# Patient Record
Sex: FEMALE | Race: WHITE | NOT HISPANIC OR LATINO | Employment: OTHER | URBAN - METROPOLITAN AREA
[De-identification: names, ages, dates, MRNs, and addresses within clinical notes are randomized per-mention and may not be internally consistent; named-entity substitution may affect disease eponyms.]

---

## 2017-04-03 ENCOUNTER — ALLSCRIPTS OFFICE VISIT (OUTPATIENT)
Dept: OTHER | Facility: OTHER | Age: 52
End: 2017-04-03

## 2018-01-13 VITALS
BODY MASS INDEX: 20.99 KG/M2 | OXYGEN SATURATION: 99 % | HEART RATE: 66 BPM | WEIGHT: 130.6 LBS | DIASTOLIC BLOOD PRESSURE: 78 MMHG | TEMPERATURE: 98.2 F | HEIGHT: 66 IN | SYSTOLIC BLOOD PRESSURE: 128 MMHG | RESPIRATION RATE: 18 BRPM

## 2018-05-18 ENCOUNTER — OFFICE VISIT (OUTPATIENT)
Dept: URGENT CARE | Facility: CLINIC | Age: 53
End: 2018-05-18
Payer: COMMERCIAL

## 2018-05-18 VITALS
DIASTOLIC BLOOD PRESSURE: 70 MMHG | TEMPERATURE: 98 F | SYSTOLIC BLOOD PRESSURE: 112 MMHG | HEART RATE: 61 BPM | HEIGHT: 65 IN | WEIGHT: 136 LBS | BODY MASS INDEX: 22.66 KG/M2 | RESPIRATION RATE: 16 BRPM | OXYGEN SATURATION: 100 %

## 2018-05-18 DIAGNOSIS — J06.9 ACUTE URI: Primary | ICD-10-CM

## 2018-05-18 PROCEDURE — 99213 OFFICE O/P EST LOW 20 MIN: CPT | Performed by: FAMILY MEDICINE

## 2018-05-18 RX ORDER — ALBUTEROL SULFATE 90 UG/1
2 AEROSOL, METERED RESPIRATORY (INHALATION) EVERY 8 HOURS PRN
Qty: 18 G | Refills: 0 | Status: SHIPPED | OUTPATIENT
Start: 2018-05-18 | End: 2018-12-29 | Stop reason: ALTCHOICE

## 2018-05-18 RX ORDER — BENZONATATE 200 MG/1
200 CAPSULE ORAL 3 TIMES DAILY PRN
Qty: 20 CAPSULE | Refills: 0 | Status: SHIPPED | OUTPATIENT
Start: 2018-05-18 | End: 2018-12-29 | Stop reason: ALTCHOICE

## 2018-05-18 RX ORDER — FLUTICASONE PROPIONATE 50 MCG
1 SPRAY, SUSPENSION (ML) NASAL DAILY
Qty: 1 BOTTLE | Refills: 0 | Status: SHIPPED | OUTPATIENT
Start: 2018-05-18 | End: 2018-12-29 | Stop reason: ALTCHOICE

## 2018-05-18 NOTE — PROGRESS NOTES
[facility]        NAME: Darrin Wisdom is a 50 y o  female  : 1965    MRN: [id number]  DATE: May 18, 2018  TIME: 9:27 AM    Assessment and Plan   Acute URI [J06 9]  1  Acute URI  benzonatate (TESSALON) 200 MG capsule    fluticasone (FLONASE) 50 mcg/act nasal spray    albuterol (PROVENTIL HFA,VENTOLIN HFA) 90 mcg/act inhaler         Patient Instructions     Patient Instructions   Acute viral URI (common cold)   - rest and drink plenty of fluids   - take Tylenol or Motrin as needed   - run a humidifier at home   - try warm salt water gargles and throat lozenges  - Tessalon pearls prescribed to help w/ cough   - use Mucinex to help w/ chest congestion   - Flonase nasal spray prescribed to help w/ sinus symptoms   - Ventolin inhaler to be used as needed for chest tightness or wheezing   - follow up w/ pcp for re-check in 3-5 days   - if symptoms persist despite treatment or worsen, should be seen in the ER       Follow up with PCP in 3-5 days  Proceed to  ER if symptoms worsen  Chief Complaint     Chief Complaint   Patient presents with    Cold Like Symptoms     chest congestion, eyes watery, ears achy, body aches, nasal congestion, PND; onset 20         History of Present Illness       49 yo female presents c/o headache, sinus pressure, nasal congestion, rhinorrhea, sore throat, PND, BL ear discomfort, and productive cough for the past 2 days  She states her chest feels tight at times and she thinks she did note some wheezing  No chest pain or SOB  Non-smoker  No GI sx  No skin rashes  No recent travel  Son is currently ill with similar symptoms  She has been taking Advil Cold and Sinus as needed  Review of Systems   Review of Systems   Constitutional: Negative  HENT:        As noted in HPI   Eyes:        As noted in HPI   Respiratory:        As noted in HPI   Cardiovascular: Negative  Gastrointestinal: Negative  Musculoskeletal: Negative  Skin: Negative  Neurological: Negative  Current Medications       Current Outpatient Prescriptions:     albuterol (PROVENTIL HFA,VENTOLIN HFA) 90 mcg/act inhaler, Inhale 2 puffs every 8 (eight) hours as needed for wheezing or shortness of breath, Disp: 18 g, Rfl: 0    benzonatate (TESSALON) 200 MG capsule, Take 1 capsule (200 mg total) by mouth 3 (three) times a day as needed for cough, Disp: 20 capsule, Rfl: 0    fluticasone (FLONASE) 50 mcg/act nasal spray, 1 spray into each nostril daily, Disp: 1 Bottle, Rfl: 0    Current Allergies     Allergies as of 05/18/2018    (No Known Allergies)            The following portions of the patient's history were reviewed and updated as appropriate: allergies, current medications, past family history, past medical history, past social history, past surgical history and problem list      History reviewed  No pertinent past medical history  History reviewed  No pertinent surgical history  Family History   Problem Relation Age of Onset    Adopted: Yes         Medications have been verified  Objective   /70   Pulse 61   Temp 98 °F (36 7 °C)   Resp 16   Ht 5' 5" (1 651 m)   Wt 61 7 kg (136 lb)   LMP 04/18/2018 (Exact Date)   SpO2 100%   Breastfeeding? No   BMI 22 63 kg/m²        Physical Exam     Physical Exam   Constitutional: She is oriented to person, place, and time  Vital signs are normal  She appears well-developed and well-nourished  She is active and cooperative  Non-toxic appearance  She does not have a sickly appearance  She does not appear ill  No distress  HENT:   Head: Normocephalic and atraumatic  Right Ear: Tympanic membrane, external ear and ear canal normal    Left Ear: Tympanic membrane, external ear and ear canal normal    Nose: Mucosal edema and rhinorrhea present  Right sinus exhibits no maxillary sinus tenderness and no frontal sinus tenderness  Left sinus exhibits no maxillary sinus tenderness and no frontal sinus tenderness  Mouth/Throat: Uvula is midline, oropharynx is clear and moist and mucous membranes are normal    Eyes: Conjunctivae and EOM are normal  Pupils are equal, round, and reactive to light  Neck: Normal range of motion  Neck supple  Cardiovascular: Normal rate, regular rhythm and normal heart sounds  Pulmonary/Chest: Effort normal  No respiratory distress  She has no wheezes  She has no rhonchi  Chest congestion and some coarse breath sounds auscultated, otherwise clear, no wheezing  Lymphadenopathy:     She has no cervical adenopathy  Neurological: She is alert and oriented to person, place, and time  Skin: She is not diaphoretic  Psychiatric: She has a normal mood and affect  Her behavior is normal  Judgment and thought content normal    Nursing note and vitals reviewed

## 2018-05-18 NOTE — PATIENT INSTRUCTIONS
Acute viral URI (common cold)   - rest and drink plenty of fluids   - take Tylenol or Motrin as needed   - run a humidifier at home   - try warm salt water gargles and throat lozenges  - Tessalon pearls prescribed to help w/ cough   - use Mucinex to help w/ chest congestion   - Flonase nasal spray prescribed to help w/ sinus symptoms   - Ventolin inhaler to be used as needed for chest tightness or wheezing   - follow up w/ pcp for re-check in 3-5 days   - if symptoms persist despite treatment or worsen, should be seen in the ER

## 2018-12-29 ENCOUNTER — APPOINTMENT (OUTPATIENT)
Dept: RADIOLOGY | Facility: CLINIC | Age: 53
End: 2018-12-29
Payer: COMMERCIAL

## 2018-12-29 ENCOUNTER — OFFICE VISIT (OUTPATIENT)
Dept: URGENT CARE | Facility: CLINIC | Age: 53
End: 2018-12-29
Payer: COMMERCIAL

## 2018-12-29 VITALS
DIASTOLIC BLOOD PRESSURE: 90 MMHG | HEART RATE: 79 BPM | OXYGEN SATURATION: 99 % | RESPIRATION RATE: 16 BRPM | BODY MASS INDEX: 22.8 KG/M2 | WEIGHT: 137 LBS | TEMPERATURE: 98.7 F | SYSTOLIC BLOOD PRESSURE: 160 MMHG

## 2018-12-29 DIAGNOSIS — T14.90XA INJURY: Primary | ICD-10-CM

## 2018-12-29 DIAGNOSIS — S62.343A CLOSED NONDISPLACED FRACTURE OF BASE OF THIRD METACARPAL BONE OF LEFT HAND, INITIAL ENCOUNTER: ICD-10-CM

## 2018-12-29 DIAGNOSIS — T14.90XA INJURY: ICD-10-CM

## 2018-12-29 PROCEDURE — 29125 APPL SHORT ARM SPLINT STATIC: CPT | Performed by: NURSE PRACTITIONER

## 2018-12-29 PROCEDURE — 73130 X-RAY EXAM OF HAND: CPT

## 2018-12-29 PROCEDURE — 99213 OFFICE O/P EST LOW 20 MIN: CPT | Performed by: NURSE PRACTITIONER

## 2018-12-29 RX ORDER — DEXTROAMPHETAMINE SACCHARATE, AMPHETAMINE ASPARTATE, DEXTROAMPHETAMINE SULFATE AND AMPHETAMINE SULFATE 5; 5; 5; 5 MG/1; MG/1; MG/1; MG/1
TABLET ORAL
Refills: 0 | COMMUNITY
Start: 2018-11-19

## 2018-12-29 RX ORDER — OXYCODONE HYDROCHLORIDE AND ACETAMINOPHEN 5; 325 MG/1; MG/1
1 TABLET ORAL EVERY 8 HOURS PRN
Qty: 10 TABLET | Refills: 0 | Status: SHIPPED | OUTPATIENT
Start: 2018-12-29

## 2018-12-29 NOTE — PROGRESS NOTES
[facility]        NAME: Jose Maria Beach is a 48 y o  female  : 1965    MRN: [id number]  DATE: 2018  TIME: 1:07 PM    Assessment and Plan   Injury [T14 90XA]  1  Injury  XR hand 3+ vw left   1  Injury  - XR hand 3+ vw left; Future    2  Closed nondisplaced fracture of base of third metacarpal bone of left hand, initial encounter  Preliminary xray reviewed  Closed non displaced fracture of base of 3rd metacarpal left hand,  however awaiting final report from radiologist  Pt made aware of same  Volar splint applied  Follow up with ortho    Patient Instructions   Preliminary xray reviewed  Apparent fracture left hand, however awaiting final report from radiologist    Splint in place until seen by orthopedics  Schedule appt with orthopedics within 3- 5 days  Ice  Percocet 1 tablet every 8 hours as needed  Chief Complaint     Chief Complaint   Patient presents with    Fall     pt fell in rain in a parking lot yesterday; apllied RICE protocol; fell forward putting arms out to break fall landed on hands/arms, facial wounds noted; denies LOC         History of Present Illness       Idalmis Dryer in parking lot last night in the rain  Slipped  Landed with left arm outstretched to break fall  Hurts to move  Swollen  Scraped face  No loc  Iced hand and took advil  Review of Systems   Review of Systems   Musculoskeletal:        Left hand pain and swelling    Skin: Positive for wound (scrapes to face)  Neurological: Negative for dizziness and headaches           Current Medications       Current Outpatient Prescriptions:     amphetamine-dextroamphetamine (ADDERALL) 20 mg tablet, TK 1 T PO TID, Disp: , Rfl: 0    Current Allergies     Allergies as of 2018    (No Known Allergies)            The following portions of the patient's history were reviewed and updated as appropriate: allergies, current medications, past family history, past medical history, past social history, past surgical history and problem list      Past Medical History:   Diagnosis Date    ADHD (attention deficit hyperactivity disorder)     lack of focus    Patient denies medical problems        Past Surgical History:   Procedure Laterality Date    NO PAST SURGERIES         Family History   Problem Relation Age of Onset    Adopted: Yes         Medications have been verified  Objective   /90   Pulse 79   Temp 98 7 °F (37 1 °C)   Resp 16   Wt 62 1 kg (137 lb)   LMP 10/29/2018 (Approximate)   SpO2 99%   BMI 22 80 kg/m²        Physical Exam     Physical Exam   Constitutional: She appears distressed (visibly uncoomfortable with movement of left hand)  Musculoskeletal: She exhibits edema and tenderness  Dorsal surface of left hand edematous  Skin:   Abrasions to chin and under nose   Preliminary xray reviewed  Closed non displaced fracture of base of 3rd metacarpal left hand,  however awaiting final report from radiologist  Pt made aware of same  Volar splint applied   Follow up with ortho    Splint application  Date/Time: 12/29/2018 1:29 PM  Performed by: Leo Barnes by: Ab Moe     Consent:     Consent obtained:  Verbal    Consent given by:  Patient    Risks discussed:  Discoloration, numbness, pain and swelling    Alternatives discussed:  Delayed treatment and alternative treatment  Pre-procedure details:     Sensation:  Normal  Procedure details:     Laterality:  Left    Location:  Hand    Hand:  L hand    Splint type:  Volar short arm    Supplies:  Cotton padding, Ortho-Glass and elastic bandage

## 2018-12-29 NOTE — PATIENT INSTRUCTIONS
Preliminary xray reviewed  Apparent fracture left hand, however awaiting final report from radiologist    Splint in place until seen by orthopedics  Schedule appt with orthopedics within 3- 5 days  Ice  Percocet 1 tablet every 8 hours as needed

## 2020-07-21 ENCOUNTER — OFFICE VISIT (OUTPATIENT)
Dept: URGENT CARE | Facility: CLINIC | Age: 55
End: 2020-07-21
Payer: COMMERCIAL

## 2020-07-21 VITALS — TEMPERATURE: 98.6 F | OXYGEN SATURATION: 99 % | RESPIRATION RATE: 16 BRPM | HEART RATE: 77 BPM

## 2020-07-21 DIAGNOSIS — Z11.59 SPECIAL SCREENING EXAMINATION FOR UNSPECIFIED VIRAL DISEASE: Primary | ICD-10-CM

## 2020-07-21 PROCEDURE — 99213 OFFICE O/P EST LOW 20 MIN: CPT | Performed by: FAMILY MEDICINE

## 2020-07-21 PROCEDURE — U0003 INFECTIOUS AGENT DETECTION BY NUCLEIC ACID (DNA OR RNA); SEVERE ACUTE RESPIRATORY SYNDROME CORONAVIRUS 2 (SARS-COV-2) (CORONAVIRUS DISEASE [COVID-19]), AMPLIFIED PROBE TECHNIQUE, MAKING USE OF HIGH THROUGHPUT TECHNOLOGIES AS DESCRIBED BY CMS-2020-01-R: HCPCS | Performed by: FAMILY MEDICINE

## 2020-07-21 NOTE — PROGRESS NOTES
[facility]        NAME: Raudel Larios is a 48 y o  female  : 1965    MRN: [id number]  DATE: 2020  TIME: 4:21 PM    Assessment and Plan   Special screening examination for unspecified viral disease [Z11 59]  1  Special screening examination for unspecified viral disease  MISC COVID-19 TEST- Office Collection     Curbside evaluation and COVID-19 swab performed  Advised to self quarantine until results are received  Patient Instructions     Follow up with PCP in 3-5 days  Proceed to  ER if symptoms worsen  Chief Complaint     Chief Complaint   Patient presents with    COVID-19     covid exposure          History of Present Illness       42-year-old female presents today desiring a COVID-19 test   Is asymptomatic, but reports that her son's to friends tested positive and he is currently symptomatic  Review of Systems   Review of Systems   Constitutional: Negative for chills and fever  HENT: Negative for congestion and rhinorrhea  Respiratory: Negative for cough and wheezing  Cardiovascular: Negative for chest pain  Gastrointestinal: Negative for abdominal pain and nausea  Skin: Negative for rash  Neurological: Negative for dizziness and headaches           Current Medications       Current Outpatient Medications:     amphetamine-dextroamphetamine (ADDERALL) 20 mg tablet, TK 1 T PO TID, Disp: , Rfl: 0    oxyCODONE-acetaminophen (PERCOCET) 5-325 mg per tablet, Take 1 tablet by mouth every 8 (eight) hours as needed for moderate pain Max Daily Amount: 3 tablets (Patient not taking: Reported on 2020), Disp: 10 tablet, Rfl: 0    Current Allergies     Allergies as of 2020    (No Known Allergies)            The following portions of the patient's history were reviewed and updated as appropriate: allergies, current medications, past family history, past medical history, past social history, past surgical history and problem list      Past Medical History: Diagnosis Date    ADHD (attention deficit hyperactivity disorder)     lack of focus    Patient denies medical problems        Past Surgical History:   Procedure Laterality Date    HAND SURGERY      NO PAST SURGERIES         Family History   Adopted: Yes         Medications have been verified  Objective   Pulse 77   Temp 98 6 °F (37 °C)   Resp 16   SpO2 99%        Physical Exam     Physical Exam   Constitutional: She is oriented to person, place, and time  She appears well-developed and well-nourished  No distress  HENT:   Head: Normocephalic and atraumatic  Eyes: Conjunctivae are normal  Right eye exhibits no discharge  Left eye exhibits no discharge  Cardiovascular: Normal rate and regular rhythm  Pulmonary/Chest: Effort normal and breath sounds normal  No stridor  No respiratory distress  She has no wheezes  She has no rales  Neurological: She is alert and oriented to person, place, and time  Skin: Skin is warm  She is not diaphoretic  No erythema  Psychiatric: She has a normal mood and affect  Her behavior is normal  Judgment and thought content normal    Nursing note and vitals reviewed

## 2020-07-25 LAB — SARS-COV-2 RNA SPEC QL NAA+PROBE: NOT DETECTED

## 2020-07-29 ENCOUNTER — TELEPHONE (OUTPATIENT)
Dept: URGENT CARE | Facility: CLINIC | Age: 55
End: 2020-07-29